# Patient Record
Sex: MALE | Race: WHITE | NOT HISPANIC OR LATINO | URBAN - METROPOLITAN AREA
[De-identification: names, ages, dates, MRNs, and addresses within clinical notes are randomized per-mention and may not be internally consistent; named-entity substitution may affect disease eponyms.]

---

## 2022-07-11 ENCOUNTER — EMERGENCY (EMERGENCY)
Facility: HOSPITAL | Age: 27
LOS: 1 days | Discharge: HOME | End: 2022-07-11
Attending: STUDENT IN AN ORGANIZED HEALTH CARE EDUCATION/TRAINING PROGRAM | Admitting: STUDENT IN AN ORGANIZED HEALTH CARE EDUCATION/TRAINING PROGRAM
Payer: COMMERCIAL

## 2022-07-11 VITALS
SYSTOLIC BLOOD PRESSURE: 132 MMHG | OXYGEN SATURATION: 97 % | HEART RATE: 59 BPM | TEMPERATURE: 99 F | DIASTOLIC BLOOD PRESSURE: 82 MMHG | RESPIRATION RATE: 16 BRPM

## 2022-07-11 VITALS
HEART RATE: 94 BPM | TEMPERATURE: 98 F | SYSTOLIC BLOOD PRESSURE: 131 MMHG | DIASTOLIC BLOOD PRESSURE: 86 MMHG | HEIGHT: 68 IN | WEIGHT: 164.91 LBS | RESPIRATION RATE: 16 BRPM | OXYGEN SATURATION: 100 %

## 2022-07-11 DIAGNOSIS — R10.31 RIGHT LOWER QUADRANT PAIN: ICD-10-CM

## 2022-07-11 DIAGNOSIS — Z91.018 ALLERGY TO OTHER FOODS: ICD-10-CM

## 2022-07-11 DIAGNOSIS — R19.7 DIARRHEA, UNSPECIFIED: ICD-10-CM

## 2022-07-11 DIAGNOSIS — Z20.822 CONTACT WITH AND (SUSPECTED) EXPOSURE TO COVID-19: ICD-10-CM

## 2022-07-11 DIAGNOSIS — Z91.010 ALLERGY TO PEANUTS: ICD-10-CM

## 2022-07-11 DIAGNOSIS — R50.9 FEVER, UNSPECIFIED: ICD-10-CM

## 2022-07-11 DIAGNOSIS — Z90.49 ACQUIRED ABSENCE OF OTHER SPECIFIED PARTS OF DIGESTIVE TRACT: ICD-10-CM

## 2022-07-11 DIAGNOSIS — R10.32 LEFT LOWER QUADRANT PAIN: ICD-10-CM

## 2022-07-11 DIAGNOSIS — R11.0 NAUSEA: ICD-10-CM

## 2022-07-11 DIAGNOSIS — Z87.19 PERSONAL HISTORY OF OTHER DISEASES OF THE DIGESTIVE SYSTEM: ICD-10-CM

## 2022-07-11 LAB
ALBUMIN SERPL ELPH-MCNC: 4.7 G/DL — SIGNIFICANT CHANGE UP (ref 3.3–5)
ALP SERPL-CCNC: 61 U/L — SIGNIFICANT CHANGE UP (ref 40–120)
ALT FLD-CCNC: 16 U/L — SIGNIFICANT CHANGE UP (ref 10–45)
ANION GAP SERPL CALC-SCNC: 12 MMOL/L — SIGNIFICANT CHANGE UP (ref 5–17)
APPEARANCE UR: CLEAR — SIGNIFICANT CHANGE UP
AST SERPL-CCNC: 23 U/L — SIGNIFICANT CHANGE UP (ref 10–40)
BASOPHILS # BLD AUTO: 0.04 K/UL — SIGNIFICANT CHANGE UP (ref 0–0.2)
BASOPHILS NFR BLD AUTO: 0.4 % — SIGNIFICANT CHANGE UP (ref 0–2)
BILIRUB SERPL-MCNC: 0.3 MG/DL — SIGNIFICANT CHANGE UP (ref 0.2–1.2)
BILIRUB UR-MCNC: NEGATIVE — SIGNIFICANT CHANGE UP
BUN SERPL-MCNC: 14 MG/DL — SIGNIFICANT CHANGE UP (ref 7–23)
CALCIUM SERPL-MCNC: 9.8 MG/DL — SIGNIFICANT CHANGE UP (ref 8.4–10.5)
CHLORIDE SERPL-SCNC: 102 MMOL/L — SIGNIFICANT CHANGE UP (ref 96–108)
CO2 SERPL-SCNC: 26 MMOL/L — SIGNIFICANT CHANGE UP (ref 22–31)
COLOR SPEC: YELLOW — SIGNIFICANT CHANGE UP
CREAT SERPL-MCNC: 1.06 MG/DL — SIGNIFICANT CHANGE UP (ref 0.5–1.3)
CRP SERPL-MCNC: <3 MG/L — SIGNIFICANT CHANGE UP (ref 0–4)
DIFF PNL FLD: NEGATIVE — SIGNIFICANT CHANGE UP
EGFR: 99 ML/MIN/1.73M2 — SIGNIFICANT CHANGE UP
EOSINOPHIL # BLD AUTO: 0.07 K/UL — SIGNIFICANT CHANGE UP (ref 0–0.5)
EOSINOPHIL NFR BLD AUTO: 0.7 % — SIGNIFICANT CHANGE UP (ref 0–6)
ERYTHROCYTE [SEDIMENTATION RATE] IN BLOOD: 6 MM/HR — SIGNIFICANT CHANGE UP
GLUCOSE SERPL-MCNC: 105 MG/DL — HIGH (ref 70–99)
GLUCOSE UR QL: NEGATIVE — SIGNIFICANT CHANGE UP
HCT VFR BLD CALC: 39.8 % — SIGNIFICANT CHANGE UP (ref 39–50)
HGB BLD-MCNC: 13.6 G/DL — SIGNIFICANT CHANGE UP (ref 13–17)
IMM GRANULOCYTES NFR BLD AUTO: 0.3 % — SIGNIFICANT CHANGE UP (ref 0–1.5)
KETONES UR-MCNC: NEGATIVE — SIGNIFICANT CHANGE UP
LEUKOCYTE ESTERASE UR-ACNC: NEGATIVE — SIGNIFICANT CHANGE UP
LIDOCAIN IGE QN: 32 U/L — SIGNIFICANT CHANGE UP (ref 7–60)
LYMPHOCYTES # BLD AUTO: 1.41 K/UL — SIGNIFICANT CHANGE UP (ref 1–3.3)
LYMPHOCYTES # BLD AUTO: 14.8 % — SIGNIFICANT CHANGE UP (ref 13–44)
MCHC RBC-ENTMCNC: 30.5 PG — SIGNIFICANT CHANGE UP (ref 27–34)
MCHC RBC-ENTMCNC: 34.2 GM/DL — SIGNIFICANT CHANGE UP (ref 32–36)
MCV RBC AUTO: 89.2 FL — SIGNIFICANT CHANGE UP (ref 80–100)
MONOCYTES # BLD AUTO: 0.73 K/UL — SIGNIFICANT CHANGE UP (ref 0–0.9)
MONOCYTES NFR BLD AUTO: 7.6 % — SIGNIFICANT CHANGE UP (ref 2–14)
NEUTROPHILS # BLD AUTO: 7.27 K/UL — SIGNIFICANT CHANGE UP (ref 1.8–7.4)
NEUTROPHILS NFR BLD AUTO: 76.2 % — SIGNIFICANT CHANGE UP (ref 43–77)
NITRITE UR-MCNC: NEGATIVE — SIGNIFICANT CHANGE UP
NRBC # BLD: 0 /100 WBCS — SIGNIFICANT CHANGE UP (ref 0–0)
PH UR: 6 — SIGNIFICANT CHANGE UP (ref 5–8)
PLATELET # BLD AUTO: 275 K/UL — SIGNIFICANT CHANGE UP (ref 150–400)
POTASSIUM SERPL-MCNC: 4.2 MMOL/L — SIGNIFICANT CHANGE UP (ref 3.5–5.3)
POTASSIUM SERPL-SCNC: 4.2 MMOL/L — SIGNIFICANT CHANGE UP (ref 3.5–5.3)
PROT SERPL-MCNC: 7.5 G/DL — SIGNIFICANT CHANGE UP (ref 6–8.3)
PROT UR-MCNC: NEGATIVE MG/DL — SIGNIFICANT CHANGE UP
RAPID RVP RESULT: SIGNIFICANT CHANGE UP
RBC # BLD: 4.46 M/UL — SIGNIFICANT CHANGE UP (ref 4.2–5.8)
RBC # FLD: 12 % — SIGNIFICANT CHANGE UP (ref 10.3–14.5)
SARS-COV-2 RNA SPEC QL NAA+PROBE: SIGNIFICANT CHANGE UP
SODIUM SERPL-SCNC: 140 MMOL/L — SIGNIFICANT CHANGE UP (ref 135–145)
SP GR SPEC: <=1.005 — SIGNIFICANT CHANGE UP (ref 1–1.03)
UROBILINOGEN FLD QL: 0.2 E.U./DL — SIGNIFICANT CHANGE UP
WBC # BLD: 9.55 K/UL — SIGNIFICANT CHANGE UP (ref 3.8–10.5)
WBC # FLD AUTO: 9.55 K/UL — SIGNIFICANT CHANGE UP (ref 3.8–10.5)

## 2022-07-11 PROCEDURE — 83690 ASSAY OF LIPASE: CPT

## 2022-07-11 PROCEDURE — G1004: CPT

## 2022-07-11 PROCEDURE — 85025 COMPLETE CBC W/AUTO DIFF WBC: CPT

## 2022-07-11 PROCEDURE — 36415 COLL VENOUS BLD VENIPUNCTURE: CPT

## 2022-07-11 PROCEDURE — 81003 URINALYSIS AUTO W/O SCOPE: CPT

## 2022-07-11 PROCEDURE — 74177 CT ABD & PELVIS W/CONTRAST: CPT | Mod: 26,MG

## 2022-07-11 PROCEDURE — 80053 COMPREHEN METABOLIC PANEL: CPT

## 2022-07-11 PROCEDURE — 99285 EMERGENCY DEPT VISIT HI MDM: CPT

## 2022-07-11 PROCEDURE — 85652 RBC SED RATE AUTOMATED: CPT

## 2022-07-11 PROCEDURE — 74177 CT ABD & PELVIS W/CONTRAST: CPT | Mod: MG

## 2022-07-11 PROCEDURE — 99284 EMERGENCY DEPT VISIT MOD MDM: CPT | Mod: 25

## 2022-07-11 PROCEDURE — 0225U NFCT DS DNA&RNA 21 SARSCOV2: CPT

## 2022-07-11 PROCEDURE — 86140 C-REACTIVE PROTEIN: CPT

## 2022-07-11 RX ORDER — DIATRIZOATE MEGLUMINE 180 MG/ML
30 INJECTION, SOLUTION INTRAVESICAL ONCE
Refills: 0 | Status: COMPLETED | OUTPATIENT
Start: 2022-07-11 | End: 2022-07-11

## 2022-07-11 RX ADMIN — DIATRIZOATE MEGLUMINE 30 MILLILITER(S): 180 INJECTION, SOLUTION INTRAVESICAL at 17:27

## 2022-07-11 RX ADMIN — Medication 20 MILLIGRAM(S): at 17:28

## 2022-07-11 NOTE — ED PROVIDER NOTE - CARE PROVIDER_API CALL
Jama Robles (MD)  Gastroenterology; Internal Medicine  [address], [address] [place] [address]  Phone: [phone]  Fax: [phone]  Follow Up Time:

## 2022-07-11 NOTE — ED PROVIDER NOTE - OBJECTIVE STATEMENT
26y Male, pmh appendicitis (s/p appendectomy 2018), presents with abdominal pain that woke him at 3am today. Describes as crampy lower abdominal pain (R > L) with one episode of bloody diarrhea. Patient had 2 episodes of nonbloody diarrhea 9 days ago, resolved until sx onset today. Also endorses mild nausea, subjective fever, chills. No recent travel, abx use, or changes in diet. Denies any vomiting, malaise, HA, cough, SOB, dysuria, urgency, increased urinary frequency, or penile discharge/pain.

## 2022-07-11 NOTE — ED PROVIDER NOTE - PATIENT PORTAL LINK FT
You can access the FollowMyHealth Patient Portal offered by [facility] by registering at the following website: [email or web address]. By joining [email or web address] [email or web address] portal, you will also be able to view your health information using other applications (apps) compatible with our system.

## 2022-07-11 NOTE — ED ADULT TRIAGE NOTE - CHIEF COMPLAINT QUOTE
Pt walked into ED c/o diarrhea and nausea intermittent for several days. pt endorses severe pain in lower abdomen. pt states he saw blood tinged stool (photograph showed, miniscule amount of blood visualized in toilet bowl). Denies any medical hx, no dysuria, no blood thinners, no fevers

## 2022-07-11 NOTE — ED ADULT NURSE NOTE - OBJECTIVE STATEMENT
pt c/o nausea /diarrhea and abdominal pain x a few days pt c/o abdominal discomfort and diarrhea started last night , c/o RLQ pain today , denies fever/chills pt c/o abdominal discomfort and diarrhea started last night , c/o RLQ pain today and passed a small amount of blood stained mucus rectally ,denies fever/chills

## 2022-07-11 NOTE — ED ADULT NURSE NOTE - NS_SISCREENINGSR_GEN_ALL_ED
"Chief Complaint   Patient presents with     Physical     with pap        Initial /83 (BP Location: Right arm, Patient Position: Chair, Cuff Size: Adult Regular)  Pulse 82  Temp 98  F (36.7  C) (Oral)  Resp 16  Ht 5' 0.7\" (1.542 m)  Wt 204 lb 4.8 oz (92.7 kg)  SpO2 98%  Breastfeeding? No  BMI 38.98 kg/m2 Estimated body mass index is 38.98 kg/(m^2) as calculated from the following:    Height as of this encounter: 5' 0.7\" (1.542 m).    Weight as of this encounter: 204 lb 4.8 oz (92.7 kg).  Medication Reconciliation: complete   " Negative

## 2022-07-11 NOTE — ED PROVIDER NOTE - CLINICAL SUMMARY MEDICAL DECISION MAKING FREE TEXT BOX
25 y/o m presents c/o right side abd pain, cramping, 1 episode of diarrhea yesterday, small blood with mucous today.  Pt afebrile, no tenderness on exam, labs unremarkable.  CT a/p shows lymphadenopathy in right lower abd, otherwise no acute findings.  Rectal with no red blood.  Will d/c, recommend bland diet, NSAIDs PRN pain, f/u GI, return to ED if sx worsen.

## 2022-07-13 ENCOUNTER — NON-APPOINTMENT (OUTPATIENT)
Age: 27
End: 2022-07-13

## 2022-07-14 DIAGNOSIS — Z00.00 ENCOUNTER FOR GENERAL ADULT MEDICAL EXAMINATION W/OUT ABNORMAL FINDINGS: ICD-10-CM

## 2022-07-18 ENCOUNTER — APPOINTMENT (OUTPATIENT)
Age: 27
End: 2022-07-18

## 2022-07-18 PROCEDURE — 99204 OFFICE O/P NEW MOD 45 MIN: CPT | Mod: 95

## 2022-07-18 NOTE — DISCUSSION/SUMMARY
[FreeTextEntry1] : Called pt to discuss recent ED eval at [facility] on 7/11 for blood noted in stool.  Pt describes he is feeling OK with some improvement in sx though about 1 day before presentation to [facility] he experienced onset of\par lower abdominal pain (R > L) with one episode of bloody diarrhea. He experienced several more episodes over the next day which raised concern and led to evaluation.\par \par Evaluated and treated supportively in ED. Since discharge, stools have been without blood and decreased in frequency to 1 loose stool/day.  Able to tolerate PO without n/v and maintain hydration.\par \par ED note reviewed along with labs/imaging.  Abdominal exam unremarkable per note.  CBC/CMP unremarkable. CRP undetectable.  CT abd/pelvis as below.\par \par Scheduled to see Dr. Robles on 7/18/22 via telemedicine.\par \par PMHx: None describes\par PSHx: Appendectomy\par Allergies: Peanuts, Sesame\par Social Hx: Non smoker\par Family Hx: No known autoimmune disease, IBD, GI disease/malignancy otherwise\par \par CT/ABD Pelvis:\par \par PROCEDURE DATE:  07/11/2022  \par \par INTERPRETATION:  CT of the ABDOMEN and PELVIS with intravenous contrast \par dated 7/11/2022 6:55 PM\par \par FINDINGS: Images of the lower chest demonstrate no abnormality.\par \par The liver is normal in appearance.  No radiopaque stones are seen in the \par gallbladder.  The pancreas is normal in appearance.  No splenic \par abnormalities are seen.\par \par The adrenal glands are unremarkable. The kidneys are normal in appearance.\par \par No abdominal aortic aneurysm is seen. No retroperitoneal lymphadenopathy \par is seen. Slightly enlarged right lower quadrant mesenteric nodes up to \par 1.1 cm short axis diameter..\par \par Evaluation of the bowel demonstrates that the ingested oral contrast \par material has reached the mid to distal ileum. No bowel obstruction. \par Transient intussusception proximal jejunal in left upper quadrant. \par Suspect that patient is status post appendectomy. Colon loaded with fecal \par material. No evidence of colonic diverticulitis or colitis.. No ascites \par is seen.\par \par Images of the pelvis demonstrate the prostate and seminal vesicles to be \par normal in appearance. Grossly normal bladder. Small fat-containing left \par inguinal hernia.\par \par Evaluation of the osseous structures demonstrates no significant \par abnormality.\par \par IMPRESSION: No bowel obstruction. No evidence of colonic diverticulitis \par or colitis. Suspect that the patient is status post appendectomy.\par Slightly enlarged right lower quadrant mesenteric nodes.\par \par A/P: 25 yo otherwise healthy M with acute bloody diarrhea with spont resolution\par \par Tolerating hydration and clinically improving. \par \par -Close interval f/u scheduled. Consider endoscopic eval as indicated at that time given bleeding\par -Pt counseled on ED precautions \par -Counseled on bland diet, hydration\par \par Case discussed in brief with Dr. Robles on 7/13/22\par \par \par ____________________\par Please refer to the above documentation which I supervised, we discussed the role of endoscopic evaluation for his episode of rectal bleeding in association of now resolved abdominal pain and is otherwise healthy 26-year-old.  He discussed the symptoms with his mother who is a pediatrician and the possibility of colonoscopy was raised at that time as well.  He has a remote history of reflux disease and was evaluated for what now sounds like right-sided abdominal pain at [facility], no imaging was performed but he has been on intermittent and acid therapy.\par \par We will plan for colonoscopy to evaluate the rectal bleeding, there are no signs this relates to inflammatory bowel disease but we did discuss that possibility.  If the abdominal pain recurs she should undergo abdominal ultrasound imaging to evaluate for gallstones.

## 2022-07-18 NOTE — REASON FOR VISIT
[Other Location: e.g. School (Enter Location, City,State)___] : at [unfilled], at the time of the visit. [Other Location: e.g. Home (Enter Location, City,State)___] : at [unfilled] [Patient] : the patient [Initial Evaluation] : an initial evaluation

## 2022-11-03 ENCOUNTER — RESULT REVIEW (OUTPATIENT)
Age: 27
End: 2022-11-03

## 2022-11-03 ENCOUNTER — APPOINTMENT (OUTPATIENT)
Age: 27
End: 2022-11-03

## 2022-11-03 PROCEDURE — 45380 COLONOSCOPY AND BIOPSY: CPT

## 2022-11-15 ENCOUNTER — APPOINTMENT (OUTPATIENT)
Dept: GASTROENTEROLOGY | Facility: CLINIC | Age: 27
End: 2022-11-15

## 2022-11-15 PROCEDURE — 99443: CPT

## 2023-01-09 ENCOUNTER — TRANSCRIPTION ENCOUNTER (OUTPATIENT)
Age: 28
End: 2023-01-09

## 2023-01-10 ENCOUNTER — LABORATORY RESULT (OUTPATIENT)
Age: 28
End: 2023-01-10

## 2023-01-10 ENCOUNTER — APPOINTMENT (OUTPATIENT)
Dept: GASTROENTEROLOGY | Facility: CLINIC | Age: 28
End: 2023-01-10
Payer: COMMERCIAL

## 2023-01-10 VITALS
DIASTOLIC BLOOD PRESSURE: 70 MMHG | SYSTOLIC BLOOD PRESSURE: 115 MMHG | TEMPERATURE: 98.2 F | OXYGEN SATURATION: 98 % | WEIGHT: 165 LBS | HEART RATE: 71 BPM | RESPIRATION RATE: 15 BRPM

## 2023-01-10 DIAGNOSIS — K50.00 CROHN'S DISEASE OF SMALL INTESTINE W/OUT COMPLICATIONS: ICD-10-CM

## 2023-01-10 PROCEDURE — 99214 OFFICE O/P EST MOD 30 MIN: CPT | Mod: 25

## 2023-01-10 PROCEDURE — 36415 COLL VENOUS BLD VENIPUNCTURE: CPT

## 2023-01-10 NOTE — PHYSICAL EXAM
[Alert] : alert [Normal Voice/Communication] : normal voice/communication [Healthy Appearing] : healthy appearing [Well Developed] : well developed [Well Nourished] : well nourished [Sclera] : the sclera and conjunctiva were normal [Normal Appearance] : the appearance of the neck was normal [No Respiratory Distress] : no respiratory distress [No Acc Muscle Use] : no accessory muscle use [Respiration, Rhythm And Depth] : normal respiratory rhythm and effort [None] : no edema [Abdomen Tenderness] : non-tender [No Masses] : no abdominal mass palpated [Abdomen Soft] : soft [] : no hepatosplenomegaly [Normal] : oriented to person, place, and time

## 2023-01-10 NOTE — END OF VISIT
[] : Fellow [FreeTextEntry3] : Patient was seen and discussed with fellow\par Note was edited and amended as necessary\par Patient was physically seen at [address]\par  [Time Spent: ___ minutes] : I have spent [unfilled] minutes of time on the encounter.

## 2023-01-10 NOTE — HISTORY OF PRESENT ILLNESS
[FreeTextEntry1] : 26 yo otherwise healthy M here today to discuss VCE following colonoscopy with Dr. Robles on 11/3.\par \par Occasionally feels twinges of pain in RLQ. Not associated with activity/postprandial. Sporadic in nature, sometimes lasting for minutes/hours. No associated diarrhea, nausea, vomiting.\par \par No fatigue-exercising rigorously 4+days/week. No new rashes. Feels well otherwise.\par \par Interested in proceeding with VCE but concerned regarding possibility of capsule retention.\par \par CLINICAL HX:\par Was evaluated in ED [facility] on 7/11 after he experienced onset of lower abdominal pain (R > L) with one episode of bloody diarrhea on 7/10. He experienced several more episodes over the next day which raised concern and led to evaluation.\par \par Evaluated and treated supportively in ED. Sx completely resolved and after discharge, stools normalized without blood and decreased in frequency to 1 loose stool/day. Was able to tolerate PO without n/v and maintain hydration.\par \par CBC/CMP unremarkable. CRP undetectable. CT abd/pelvis as below and notable only for transient intussusception proximal jejunal in left upper quadrant and slightly enlarged RLQ mesenteric node but no e/o colitis.\par \par Subsequently underwent colonoscopy on 11/3 given hematochezia noting localized inflammation, mod in severity and friability, granularity and linear erosions in TI.\par \par Biopsy with mild active ileitis and prominent lymphoid population c/w Peyer's patches. Immunohistochemistry reavling majority of cells to be T cells and no e/o lymphoproliferative process.\par \par PMHx: None \par PSHx: Appendectomy\par Allergies: Peanuts, Sesame\par Social Hx: Non smoker\par Family Hx: No known autoimmune disease, IBD, GI disease/malignancy otherwise\par \par CT/ABD Pelvis:\par \par PROCEDURE DATE: 07/11/2022 \par \par INTERPRETATION: CT of the ABDOMEN and PELVIS with intravenous contrast \par dated 7/11/2022 6:55 PM\par \par FINDINGS: Images of the lower chest demonstrate no abnormality.\par \par The liver is normal in appearance. No radiopaque stones are seen in the \par gallbladder. The pancreas is normal in appearance. No splenic \par abnormalities are seen.\par \par The adrenal glands are unremarkable. The kidneys are normal in appearance.\par \par No abdominal aortic aneurysm is seen. No retroperitoneal lymphadenopathy \par is seen. Slightly enlarged right lower quadrant mesenteric nodes up to \par 1.1 cm short axis diameter..\par \par Evaluation of the bowel demonstrates that the ingested oral contrast \par material has reached the mid to distal ileum. No bowel obstruction. \par Transient intussusception proximal jejunal in left upper quadrant. \par Suspect that patient is status post appendectomy. Colon loaded with fecal \par material. No evidence of colonic diverticulitis or colitis.. No ascites \par is seen.\par \par Images of the pelvis demonstrate the prostate and seminal vesicles to be \par normal in appearance. Grossly normal bladder. Small fat-containing left \par inguinal hernia.\par \par Evaluation of the osseous structures demonstrates no significant \par abnormality.\par \par IMPRESSION: No bowel obstruction. No evidence of colonic diverticulitis \par or colitis. Suspect that the patient is status post appendectomy.\par Slightly enlarged right lower quadrant mesenteric nodes.\par

## 2023-01-10 NOTE — ASSESSMENT
[FreeTextEntry1] : 28 yo otherwise healthy M with transient abd pain/hematochezia in July 2022 with CT noting transient intussusception proximal jejunal in left upper quadrant and slightly enlarged RLQ mesenteric node but no e/o colitis and 11/3 colonoscopy with gross inflammatory changes in TI with mildly active ileitis on pathology.\par \par Asymptomatic at present and unclear clinical significance of pathology. DDx includes Crohns disease, though pathology largely non-specific and in absence of sx, no clear IBD diagnosis. Ongoing occasional RLQ pain does raise concern for persistent inflammation in TI.  Uncertain association with transient intussusception of prox jejunum on imaging which also may explain sx at time of ED presentation.\par \par We discussed risks v benefits of VCE in setting of suspected CD. Alternatives to help determine whether there is small bowel inflammation otherwise with MRE also discussed.  Following shared decision making, will pursue MRE +/- VCE after pending results.\par \par Recommendations:\par -MRE ordered\par -Repeat CBC, CMP, CRP today + pre-biologic workup should MRE suggest CD\par \par Will call pt with results\par \par Staffed with attending, Dr. Carter\par \par Jose Iyer, \par Gastroenterology Fellow\par

## 2023-01-10 NOTE — HISTORY OF PRESENT ILLNESS
[FreeTextEntry1] : 26 yo otherwise healthy M here today to discuss VCE following colonoscopy with Dr. Robles on 11/3.\par \par Occasionally feels twinges of pain in RLQ. Not associated with activity/postprandial. Sporadic in nature, sometimes lasting for minutes/hours. No associated diarrhea, nausea, vomiting.\par \par No fatigue-exercising rigorously 4+days/week. No new rashes. Feels well otherwise.\par \par Interested in proceeding with VCE but concerned regarding possibility of capsule retention.\par \par CLINICAL HX:\par Was evaluated in ED [facility] on 7/11 after he experienced onset of lower abdominal pain (R > L) with one episode of bloody diarrhea on 7/10. He experienced several more episodes over the next day which raised concern and led to evaluation.\par \par Evaluated and treated supportively in ED. Sx completely resolved and after discharge, stools normalized without blood and decreased in frequency to 1 loose stool/day. Was able to tolerate PO without n/v and maintain hydration.\par \par CBC/CMP unremarkable. CRP undetectable. CT abd/pelvis as below and notable only for transient intussusception proximal jejunal in left upper quadrant and slightly enlarged RLQ mesenteric node but no e/o colitis.\par \par Subsequently underwent colonoscopy on 11/3 given hematochezia noting localized inflammation, mod in severity and friability, granularity and linear erosions in TI.\par \par Biopsy with mild active ileitis and prominent lymphoid population c/w Peyer's patches. Immunohistochemistry reavling majority of cells to be T cells and no e/o lymphoproliferative process.\par \par PMHx: None \par PSHx: Appendectomy\par Allergies: Peanuts, Sesame\par Social Hx: Non smoker\par Family Hx: No known autoimmune disease, IBD, GI disease/malignancy otherwise\par \par CT/ABD Pelvis:\par \par PROCEDURE DATE: 07/11/2022 \par \par INTERPRETATION: CT of the ABDOMEN and PELVIS with intravenous contrast \par dated 7/11/2022 6:55 PM\par \par FINDINGS: Images of the lower chest demonstrate no abnormality.\par \par The liver is normal in appearance. No radiopaque stones are seen in the \par gallbladder. The pancreas is normal in appearance. No splenic \par abnormalities are seen.\par \par The adrenal glands are unremarkable. The kidneys are normal in appearance.\par \par No abdominal aortic aneurysm is seen. No retroperitoneal lymphadenopathy \par is seen. Slightly enlarged right lower quadrant mesenteric nodes up to \par 1.1 cm short axis diameter..\par \par Evaluation of the bowel demonstrates that the ingested oral contrast \par material has reached the mid to distal ileum. No bowel obstruction. \par Transient intussusception proximal jejunal in left upper quadrant. \par Suspect that patient is status post appendectomy. Colon loaded with fecal \par material. No evidence of colonic diverticulitis or colitis.. No ascites \par is seen.\par \par Images of the pelvis demonstrate the prostate and seminal vesicles to be \par normal in appearance. Grossly normal bladder. Small fat-containing left \par inguinal hernia.\par \par Evaluation of the osseous structures demonstrates no significant \par abnormality.\par \par IMPRESSION: No bowel obstruction. No evidence of colonic diverticulitis \par or colitis. Suspect that the patient is status post appendectomy.\par Slightly enlarged right lower quadrant mesenteric nodes.\par

## 2023-01-17 ENCOUNTER — TRANSCRIPTION ENCOUNTER (OUTPATIENT)
Age: 28
End: 2023-01-17

## 2023-01-17 LAB
25(OH)D3 SERPL-MCNC: 15.1 NG/ML
ALBUMIN SERPL ELPH-MCNC: 4.9 G/DL
ALP BLD-CCNC: 52 U/L
ALT SERPL-CCNC: 14 U/L
ANION GAP SERPL CALC-SCNC: 10 MMOL/L
AST SERPL-CCNC: 17 U/L
BASOPHILS # BLD AUTO: 0.04 K/UL
BASOPHILS NFR BLD AUTO: 0.6 %
BILIRUB SERPL-MCNC: 0.3 MG/DL
BUN SERPL-MCNC: 17 MG/DL
CALCIUM SERPL-MCNC: 10 MG/DL
CHLORIDE SERPL-SCNC: 101 MMOL/L
CO2 SERPL-SCNC: 29 MMOL/L
CREAT SERPL-MCNC: 1.13 MG/DL
CRP SERPL-MCNC: <3 MG/L
EGFR: 91 ML/MIN/1.73M2
EOSINOPHIL # BLD AUTO: 0.21 K/UL
EOSINOPHIL NFR BLD AUTO: 3.2 %
FERRITIN SERPL-MCNC: 169 NG/ML
GLUCOSE SERPL-MCNC: 101 MG/DL
HBV CORE IGG+IGM SER QL: NONREACTIVE
HBV SURFACE AB SER QL: NONREACTIVE
HBV SURFACE AG SER QL: NONREACTIVE
HCT VFR BLD CALC: 42 %
HCV AB SER QL: NONREACTIVE
HCV S/CO RATIO: 0.07 S/CO
HEPATITIS A IGG ANTIBODY: REACTIVE
HGB BLD-MCNC: 14.1 G/DL
IMM GRANULOCYTES NFR BLD AUTO: 0.3 %
IRON SATN MFR SERPL: 27 %
IRON SERPL-MCNC: 87 UG/DL
LYMPHOCYTES # BLD AUTO: 2.58 K/UL
LYMPHOCYTES NFR BLD AUTO: 39.8 %
M TB IFN-G BLD-IMP: NEGATIVE
MAN DIFF?: NORMAL
MCHC RBC-ENTMCNC: 30.6 PG
MCHC RBC-ENTMCNC: 33.6 GM/DL
MCV RBC AUTO: 91.1 FL
MONOCYTES # BLD AUTO: 0.73 K/UL
MONOCYTES NFR BLD AUTO: 11.2 %
NEUTROPHILS # BLD AUTO: 2.91 K/UL
NEUTROPHILS NFR BLD AUTO: 44.9 %
PLATELET # BLD AUTO: 270 K/UL
POTASSIUM SERPL-SCNC: 4 MMOL/L
PROT SERPL-MCNC: 7.5 G/DL
QUANTIFERON TB PLUS MITOGEN MINUS NIL: 5.32 IU/ML
QUANTIFERON TB PLUS NIL: 0.03 IU/ML
QUANTIFERON TB PLUS TB1 MINUS NIL: -0.01 IU/ML
QUANTIFERON TB PLUS TB2 MINUS NIL: -0.01 IU/ML
RBC # BLD: 4.61 M/UL
RBC # FLD: 12.2 %
SODIUM SERPL-SCNC: 139 MMOL/L
TIBC SERPL-MCNC: 330 UG/DL
UIBC SERPL-MCNC: 242 UG/DL
VIT B12 USB SERPL-MCNC: 833 PG/ML
WBC # FLD AUTO: 6.49 K/UL

## 2023-01-18 LAB
TPMT ENZYME INTERPRETATION: NORMAL
TPMT ENZYME METHODOLOGY: NORMAL
TPMT ENZYME: 21.2

## 2024-01-09 ENCOUNTER — EMERGENCY (EMERGENCY)
Facility: HOSPITAL | Age: 29
LOS: 1 days | Discharge: HOME | End: 2024-01-09
Attending: EMERGENCY MEDICINE | Admitting: EMERGENCY MEDICINE
Payer: COMMERCIAL

## 2024-01-09 VITALS
OXYGEN SATURATION: 98 % | HEART RATE: 71 BPM | RESPIRATION RATE: 18 BRPM | SYSTOLIC BLOOD PRESSURE: 132 MMHG | TEMPERATURE: 99 F | DIASTOLIC BLOOD PRESSURE: 82 MMHG

## 2024-01-09 VITALS
OXYGEN SATURATION: 96 % | RESPIRATION RATE: 16 BRPM | DIASTOLIC BLOOD PRESSURE: 65 MMHG | SYSTOLIC BLOOD PRESSURE: 140 MMHG | TEMPERATURE: 99 F | HEART RATE: 102 BPM

## 2024-01-09 PROCEDURE — 99284 EMERGENCY DEPT VISIT MOD MDM: CPT

## 2024-01-09 RX ORDER — DIPHENHYDRAMINE HCL 50 MG
50 CAPSULE ORAL ONCE
Refills: 0 | Status: COMPLETED | OUTPATIENT
Start: 2024-01-09 | End: 2024-01-09

## 2024-01-09 RX ORDER — EPINEPHRINE 0.3 MG/.3ML
0.3 INJECTION INTRAMUSCULAR; SUBCUTANEOUS ONCE
Refills: 0 | Status: COMPLETED | OUTPATIENT
Start: 2024-01-09 | End: 2024-01-09

## 2024-01-09 RX ORDER — FAMOTIDINE 10 MG/ML
20 INJECTION INTRAVENOUS ONCE
Refills: 0 | Status: COMPLETED | OUTPATIENT
Start: 2024-01-09 | End: 2024-01-09

## 2024-01-09 RX ORDER — DEXAMETHASONE 0.5 MG/5ML
10 ELIXIR ORAL ONCE
Refills: 0 | Status: COMPLETED | OUTPATIENT
Start: 2024-01-09 | End: 2024-01-09

## 2024-01-09 RX ADMIN — FAMOTIDINE 20 MILLIGRAM(S): 10 INJECTION INTRAVENOUS at 19:42

## 2024-01-09 RX ADMIN — Medication 50 MILLIGRAM(S): at 19:41

## 2024-01-09 RX ADMIN — EPINEPHRINE 0.3 MILLIGRAM(S): 0.3 INJECTION INTRAMUSCULAR; SUBCUTANEOUS at 19:42

## 2024-01-09 RX ADMIN — Medication 102 MILLIGRAM(S): at 19:41

## 2024-01-09 NOTE — ED PROVIDER NOTE - CLINICAL SUMMARY MEDICAL DECISION MAKING FREE TEXT BOX
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  Two systems involved - throat and GI.  Epipen given on arrival given throat closing sensation.  IV placed and will give decadron, benadryl, and pepcid.  Will observe patient and monitor sxs.

## 2024-01-09 NOTE — ED PROVIDER NOTE - NSFOLLOWUPINSTRUCTIONS_ED_ALL_ED_FT
Anaphylactic Reaction, Adult  An anaphylactic reaction is a sudden and very bad allergic reaction. It must be treated right away.    What are the causes?  Being exposed to things you are allergic to (allergens). These may be:  Foods, like peanuts, wheat, shellfish, milk, or eggs.  Medicines.  Insect bites or stings.  Blood or parts of blood that have been given to you for treatment (transfusions).  Chemicals. These include latex and dyes used in food and medical tests.  What increases the risk?  Having allergies.  Having had this kind of reaction before.  Having a family history of this kind of reaction.  Having asthma or eczema.  What are the signs or symptoms?  Feeling warm in the face (flushed). Your face may turn red.  Hives. These are itchy, red, swollen areas of skin.  Swelling of the eyes, lips, face, mouth, tongue, or throat.  Trouble breathing or speaking.  Trouble swallowing.  Feeling dizzy or fainting.  Pain or cramps in your belly (abdomen).  Vomiting or watery poop (diarrhea).  How is this treated?  A person using an auto-injector pen in the thigh.  If you are having a bad allergic reaction, you should:  Give yourself a shot using a device filled with epinephrine (auto-injector pen). Your doctor will teach you how to use the pen.  Call for help. If you use an auto-injector pen, you must still get treated in the hospital. You may be given:  Medicines.  Oxygen.  Fluids in an IV tube.  Follow these instructions at home:  Safety    Always keep an auto-injector pen with you. If you can, carry two pens. Use the pen as told by your doctor. After you use the pen, get more medicine for it right away.  Do not drive after you have a reaction. Wait until your doctor says it is safe to drive.  Make sure that you, the people who live with you, and your employer know:  What you are allergic to.  How to use your auto-injector pen.  If told by your doctor, wear a bracelet or necklace that says you have an allergy.  Learn the signs of a very bad allergic reaction.  Work with your doctors to make a plan for what to do if you have a very bad reaction.  General instructions    Take over-the-counter and prescription medicines only as told by your doctor.  If you have a rash or itchy skin:  Use an allergy medicine as told by your doctor.  Put cold, wet cloths on your skin.  Take a cool bath or shower. Do not use hot water.  Tell all of your doctors that you have an allergy.  How is this prevented?  Avoid things that have given you a bad reaction before.  Tell your  about your allergy when you go out to eat. If you are not sure if your meal contains food that you are allergic to, ask your  before you eat it.  Where to find more information  American Academy of Allergy, Asthma, and Immunology (AAAAI): aaaai.org  Contact a doctor if:  Your auto-injector pen is .  Get help right away if:  You have a bad allergic reaction.  You use your auto-injector pen. You must go to the hospital even if the medicine seems to be working.  These symptoms may be an emergency. Use the auto-injector pen right away. Then call 911.  Do not wait to see if the symptoms will go away.  Do not drive yourself to the hospital.  This information is not intended to replace advice given to you by your health care provider. Make sure you discuss any questions you have with your health care provider.

## 2024-01-09 NOTE — ED PROVIDER NOTE - PATIENT PORTAL LINK FT
You can access the FollowMyHealth Patient Portal offered by [facility] by registering at the following website: [email or web address]. By joining [employer]’s FollowMyHealth portal, you will also be able to view your health information using other applications (apps) compatible with our system. You can access the FollowMyHealth Patient Portal offered by [facility] by registering at the following website: [email or web address]. By joining [employer]’s FollowMyHealth portal, you will also be able to view your health information using other applications (apps) compatible with our system.

## 2024-01-09 NOTE — ED PROVIDER NOTE - OBJECTIVE STATEMENT
Pt is a 27yo M with a h/o asthma and allergy to peanuts and sesame seeds.  Pt accidentally ate a hamburger with sesame seeds @~7/715 this evening.  Shortly after he developed throat irritation and closing sensation.  He also developed nausea but no vomiting.  Denies any hives but does have some mild itching.  Denies any tongue or lip or facial swelling.  Denies any other sxs.  Reports only having to use an epipen once in the past, ~5 years ago.

## 2024-01-09 NOTE — ED ADULT NURSE NOTE - NS ED NURSE DC INFO COMPLEXITY
Simple: Patient demonstrates quick and easy understanding/Moderate: Comprehensive teaching/Straightforward: Basic instructions, no meds, no home treatment/Patient asked questions/Returned Demonstration/Verbalized Understanding

## 2024-01-09 NOTE — ED ADULT NURSE REASSESSMENT NOTE - NS ED NURSE REASSESS COMMENT FT1
Received pt on the stretcher resting comfortably, speak in full sentence. Denies throat irritation, sob, n/v, NAD at this time. pt able to ambulate on steady gait. safety maintained.

## 2024-01-09 NOTE — ED ADULT TRIAGE NOTE - CHIEF COMPLAINT QUOTE
Pt walked in c/o throat tightness after eating a burger. Pt has an allergy to nuts and sesame seeds. No meds pta. Speaking in full clear sentences

## 2024-01-09 NOTE — ED PROVIDER NOTE - PROGRESS NOTE DETAILS
Pt reports he is feeling back to his normal self.  Sxs completely resolved.  Repeat exam remains benign.  Pt admits he thinks he may have been anxious about seeing the sesame seeds in his hamburger.  Regardless we discussed allergic reactions and when to use epipen, etc.  Pt has epipen at home.  Will d/c with his partner.  They live together and she will continue monitoring him tonight.  Discussed emergent return precautions.

## 2024-01-09 NOTE — ED PROVIDER NOTE - PHYSICAL EXAMINATION
VITAL SIGNS: I have reviewed nursing notes and confirm.  CONSTITUTIONAL: Well-developed; well-nourished; appears anxious.   SKIN: Skin is warm and dry, no acute rash or hives.   HEAD: Normocephalic; atraumatic.  EYES: PERRL, EOM intact; conjunctiva and sclera clear.  ENT: No nasal discharge; airway clear; no lip/tongue/uvular edema.   NECK: Supple; non tender.  CARD: S1, S2 normal; no murmurs, gallops, or rubs. Regular rate and rhythm.  RESP: No wheezes, rales or rhonchi.  ABD: Normal bowel sounds; soft; non-distended; non-tender; no hepatosplenomegaly.  MSK: Normal ROM. No clubbing, cyanosis or edema.  NEURO: Alert, oriented. Grossly unremarkable.  PSYCH: Cooperative, appropriate.

## 2024-01-10 DIAGNOSIS — Z91.010 ALLERGY TO PEANUTS: ICD-10-CM

## 2024-01-10 DIAGNOSIS — T78.05XA ANAPHYLACTIC REACTION DUE TO TREE NUTS AND SEEDS, INITIAL ENCOUNTER: ICD-10-CM

## 2024-01-10 DIAGNOSIS — J45.909 UNSPECIFIED ASTHMA, UNCOMPLICATED: ICD-10-CM

## 2024-01-10 DIAGNOSIS — Z91.018 ALLERGY TO OTHER FOODS: ICD-10-CM

## 2024-01-10 DIAGNOSIS — R11.0 NAUSEA: ICD-10-CM
